# Patient Record
Sex: MALE | Race: BLACK OR AFRICAN AMERICAN
[De-identification: names, ages, dates, MRNs, and addresses within clinical notes are randomized per-mention and may not be internally consistent; named-entity substitution may affect disease eponyms.]

---

## 2021-12-04 ENCOUNTER — HOSPITAL ENCOUNTER (EMERGENCY)
Dept: HOSPITAL 56 - MW.ED | Age: 51
LOS: 1 days | Discharge: HOME | End: 2021-12-05
Payer: COMMERCIAL

## 2021-12-04 DIAGNOSIS — U07.1: Primary | ICD-10-CM

## 2021-12-04 LAB
BUN SERPL-MCNC: 8 MG/DL (ref 7–18)
CHLORIDE SERPL-SCNC: 103 MMOL/L (ref 98–107)
CO2 SERPL-SCNC: 28.6 MMOL/L (ref 21–32)
FLUAV RNA UPPER RESP QL NAA+PROBE: NEGATIVE
FLUBV RNA UPPER RESP QL NAA+PROBE: NEGATIVE
GLUCOSE SERPL-MCNC: 122 MG/DL (ref 74–106)
POTASSIUM SERPL-SCNC: 3.7 MMOL/L (ref 3.5–5.1)
SARS-COV-2 RNA RESP QL NAA+PROBE: POSITIVE
SODIUM SERPL-SCNC: 140 MMOL/L (ref 136–148)

## 2021-12-04 PROCEDURE — 85025 COMPLETE CBC W/AUTO DIFF WBC: CPT

## 2021-12-04 PROCEDURE — 85379 FIBRIN DEGRADATION QUANT: CPT

## 2021-12-04 PROCEDURE — 83735 ASSAY OF MAGNESIUM: CPT

## 2021-12-04 PROCEDURE — 0240U: CPT

## 2021-12-04 PROCEDURE — 99285 EMERGENCY DEPT VISIT HI MDM: CPT

## 2021-12-04 PROCEDURE — 36415 COLL VENOUS BLD VENIPUNCTURE: CPT

## 2021-12-04 PROCEDURE — 84484 ASSAY OF TROPONIN QUANT: CPT

## 2021-12-04 PROCEDURE — 71275 CT ANGIOGRAPHY CHEST: CPT

## 2021-12-04 PROCEDURE — 80048 BASIC METABOLIC PNL TOTAL CA: CPT

## 2021-12-04 PROCEDURE — 96372 THER/PROPH/DIAG INJ SC/IM: CPT

## 2021-12-04 PROCEDURE — 71045 X-RAY EXAM CHEST 1 VIEW: CPT

## 2021-12-04 PROCEDURE — 93005 ELECTROCARDIOGRAM TRACING: CPT

## 2021-12-04 NOTE — EDM.PDOC
ED HPI GENERAL MEDICAL PROBLEM





- General


Chief Complaint: Headache


Stated Complaint: HEADACHE


Time Seen by Provider: 12/04/21 19:20





- History of Present Illness


INITIAL COMMENTS - FREE TEXT/NARRATIVE: 





CHIEF COMPLAINT(S): Headache and chest pain








HISTORY OF PRESENT ILLNESS: This is a 51-year-old man without any significant 

past medical history who comes to the emergency department with a chief 

complaint of headache and chest pain.  The patient states that for approximately

1 day now he has been experiencing headache and chest pain.  He describes his 

headache as bifrontal not associated with any blurry vision, double vision, loss

of vision, numbness, tingling, weakness.  In addition he states that he is also 

experiencing right-sided chest pain which is intermittent.  He denies any 

shortness of breath, cough, runny nose.  He currently rates both pain as 5 out 

of 10.  He has not yet tried anything for the pain.  There are no exacerbating 

or relieving factors.  He states that he is vaccinated however he did get his 

first dose 3 days ago.  He denies any history of CAD or CHF, recent travel, 

recent surgery or prior history of DVT or PE.





 


REVIEW OF SYSTEMS: 





Constitutional: Denies fever, chills.


Eyes: Denies eye pain


Ears, Nose, Mouth, & Throat: Denies earache


Cardiovascular: Positive for chest pain


Respiratory: Denies shortness of breath


Gastrointestinal: Denies Nausea, vomiting, diarrhea, hematochezia.


Genitourinary: Denies hematuria


Skin:Denies a rash


MSK: Denies joint pain


Neurological: Positive for headache.  Denies blurred vision, numbness, tingling,

weakness, diplopia


Psychiatric: Denies depression








PAST MEDICAL HISTORY: As per history of present illness and as reviewed below 

otherwise noncontributory.





SURGICAL HISTORY: As per history of present illness and as reviewed below 

otherwise noncontributory.





SOCIAL HISTORY: As per history of present illness and as reviewed below 

otherwise noncontributory.





FAMILY HISTORY: As per history of present illness and as reviewed below 

otherwise noncontributory.








EXAMINATION OF ORGAN SYSTEMS/BODY AREAS: 





Constitutional: Blood pressure is 162/92, heart rate 98, respiratory rate 16 

with an oxygen saturation 98% on room air.  Temperature 36.5


General: Well-appearing man who is in no acute distress


Psychiatric: Appropriate mood and affect.


Eyes: No scleral icterus or conjunctival erythema


ENMT: Moist mucous membranes. No pharyngeal erythema


Cardiovascular: Regular, rate, and rhythm.  No gallops, murmurs, or rubs.  

Bilateral upper extremity pulses symmetric and intact.  No peripheral edema.  No

JVD.


Respiratory: Lungs clear to auscultation bilaterally.  No wheezes, rales, or 

rhonchi.


Gastrointestinal: Soft, non-tender, non-distended.  Normoactive bowel sounds


Genitourinary: No suprapubic tenderness


Musculoskeletal: Normal range of motion.


Skin: No lesions or abrasions.


Neurological:     AOx4. CN grossly intact. Strength 5/5 in bilateral upper and 

lower extremity. Sensation is intact bilaterally in upper and lower extremity. 

Gait appears normal. Finger to nose, heel to shin, rapid alternating movements 

intact.








MEDICAL DECISION MAKING AND COURSE IN THE ED WITH INTERPRETATION/REVIEW OF 

DIAGNOSTIC STUDIES: This is a 51-year-old man without any significant past 

medical history who comes to the emergency department with bifrontal headache 

and intermittent chest pain who overall appears well and is mildly hypertensive.

 At this time given his age will obtain on ACS work-up.  In addition PE is a 

consideration will obtain a D-dimer for stratification and obtaining angiogram 

if it is positive.  Screening EKG was unremarkable.  We will provide the patient

with aspirin and Toradol for pain relief.





DDx: ACS, Covid, pneumonia, pulmonary embolism





Laboratory: CBC is unremarkable.  BMP is unremarkable.  Troponin is negative.  

D-dimer is elevated at 0.61.





The radiological images were viewed by myself along with reading the report from

the radiologist.


Chest x-ray does not reveal an acute cardiopulmonary process.





Given the elevated D-dimer will obtain a CT angiogram of the chest to evaluate 

for pulmonary embolism.





The radiological images were viewed by myself along with reading the report from

the radiologist.


CT angiogram of the chest reveals no evidence of pulmonary embolism or acute 

intrathoracic process.





Laboratory: Covid is positive.  Influenza is negative.





After labs and imaging I did discuss the results with the patient.  I discussed 

that he would be stable for discharge.  He was given strict return precautions 

and is to isolate for the next 10 days.  He was amenable discharge and had no 

further questions











DISPOSITION: The patient was discharged home in stable condition. The patient 

will follow up with primary care physician after isolation





CONDITION: Fair





PROCEDURES: None





FINAL IMPRESSION(S)/DIAGNOSES: 





1.  Acute COVID-19 infection





 





Eliceo Croft M.D.


  ** Right Chest


Pain Score (Numeric/FACES): 5





- Related Data


                                    Allergies











Allergy/AdvReac Type Severity Reaction Status Date / Time


 


No Known Allergies Allergy   Verified 12/04/21 19:20











Home Meds: 


                                    Home Meds





. [No Known Home Meds]  07/30/18 [History]











Past Medical History





- Past Health History


Medical/Surgical History: Denies Medical/Surgical History





- Infectious Disease History


Infectious Disease History: Reports: None





Social & Family History





- Family History


Family Medical History: No Pertinent Family History





- Tobacco Use


Tobacco Use Status *Q: Never Tobacco User





- Caffeine Use


Caffeine Use: Reports: Coffee





- Recreational Drug Use


Recreational Drug Use: No





ED ROS GENERAL





- Review of Systems


Review Of Systems: See Below





ED EXAM, GENERAL





- Physical Exam


Exam: See Below





Course





- Vital Signs


Last Recorded V/S: 


                                Last Vital Signs











Temp  36.1 C   12/05/21 00:09


 


Pulse  81   12/05/21 00:09


 


Resp  14   12/05/21 00:09


 


BP  151/82 H  12/05/21 00:09


 


Pulse Ox  98   12/05/21 00:09














- Orders/Labs/Meds


Labs: 


                                Laboratory Tests











  12/04/21 12/04/21 12/04/21 Range/Units





  19:53 19:53 19:53 


 


WBC  5.83    (4.0-11.0)  K/uL


 


RBC  4.64    (4.50-5.90)  M/uL


 


Hgb  14.0    (13.0-17.0)  g/dL


 


Hct  40.3    (38.0-50.0)  %


 


MCV  86.9    (80.0-98.0)  fL


 


MCH  30.2    (27.0-32.0)  pg


 


MCHC  34.7    (31.0-37.0)  g/dL


 


RDW Std Deviation  42.1    (28.0-62.0)  fl


 


RDW Coeff of Dmitriy  13    (11.0-15.0)  %


 


Plt Count  252    (150-400)  K/uL


 


MPV  10.80    (7.40-12.00)  fL


 


Neut % (Auto)  61.6    (48.0-80.0)  %


 


Lymph % (Auto)  30.4    (16.0-40.0)  %


 


Mono % (Auto)  7.5    (0.0-15.0)  %


 


Eos % (Auto)  0.3    (0.0-7.0)  %


 


Baso % (Auto)  0.2    (0.0-1.5)  %


 


Neut # (Auto)  3.6    (1.4-5.7)  K/uL


 


Lymph # (Auto)  1.8    (0.6-2.4)  K/uL


 


Mono # (Auto)  0.4    (0.0-0.8)  K/uL


 


Eos # (Auto)  0.0    (0.0-0.7)  K/uL


 


Baso # (Auto)  0.0    (0.0-0.1)  K/uL


 


Nucleated RBC %  0.0    /100WBC


 


Nucleated RBCs #  0    K/uL


 


D-Dimer, Quantitative    0.61 H  (0.0-0.50)  mg/L FEU


 


Sodium   140   (136-148)  mmol/L


 


Potassium   3.7   (3.5-5.1)  mmol/L


 


Chloride   103   ()  mmol/L


 


Carbon Dioxide   28.6   (21.0-32.0)  mmol/L


 


BUN   8   (7.0-18.0)  mg/dL


 


Creatinine   0.9   (0.8-1.3)  mg/dL


 


Est Cr Clr Drug Dosing   93.45   mL/min


 


Estimated GFR (MDRD)   > 60.0   ml/min


 


Glucose   122 H   ()  mg/dL


 


Calcium   8.9   (8.5-10.1)  mg/dL


 


Magnesium   2.1   (1.8-2.4)  mg/dL


 


Troponin I   < 0.050   (0.000-0.056)  ng/mL


 


Influenza Type A RNA     (NEGATIVE)  


 


Influenza Type B RNA     (NEGATIVE)  


 


SARS-CoV-2 RNA (LEDY)     (NEGATIVE)  














  12/04/21 Range/Units





  22:03 


 


WBC   (4.0-11.0)  K/uL


 


RBC   (4.50-5.90)  M/uL


 


Hgb   (13.0-17.0)  g/dL


 


Hct   (38.0-50.0)  %


 


MCV   (80.0-98.0)  fL


 


MCH   (27.0-32.0)  pg


 


MCHC   (31.0-37.0)  g/dL


 


RDW Std Deviation   (28.0-62.0)  fl


 


RDW Coeff of Dmitriy   (11.0-15.0)  %


 


Plt Count   (150-400)  K/uL


 


MPV   (7.40-12.00)  fL


 


Neut % (Auto)   (48.0-80.0)  %


 


Lymph % (Auto)   (16.0-40.0)  %


 


Mono % (Auto)   (0.0-15.0)  %


 


Eos % (Auto)   (0.0-7.0)  %


 


Baso % (Auto)   (0.0-1.5)  %


 


Neut # (Auto)   (1.4-5.7)  K/uL


 


Lymph # (Auto)   (0.6-2.4)  K/uL


 


Mono # (Auto)   (0.0-0.8)  K/uL


 


Eos # (Auto)   (0.0-0.7)  K/uL


 


Baso # (Auto)   (0.0-0.1)  K/uL


 


Nucleated RBC %   /100WBC


 


Nucleated RBCs #   K/uL


 


D-Dimer, Quantitative   (0.0-0.50)  mg/L FEU


 


Sodium   (136-148)  mmol/L


 


Potassium   (3.5-5.1)  mmol/L


 


Chloride   ()  mmol/L


 


Carbon Dioxide   (21.0-32.0)  mmol/L


 


BUN   (7.0-18.0)  mg/dL


 


Creatinine   (0.8-1.3)  mg/dL


 


Est Cr Clr Drug Dosing   mL/min


 


Estimated GFR (MDRD)   ml/min


 


Glucose   ()  mg/dL


 


Calcium   (8.5-10.1)  mg/dL


 


Magnesium   (1.8-2.4)  mg/dL


 


Troponin I   (0.000-0.056)  ng/mL


 


Influenza Type A RNA  NEGATIVE  (NEGATIVE)  


 


Influenza Type B RNA  NEGATIVE  (NEGATIVE)  


 


SARS-CoV-2 RNA (LEDY)  POSITIVE H  (NEGATIVE)  











Meds: 


Medications














Discontinued Medications














Generic Name Dose Route Start Last Admin





  Trade Name Freq  PRN Reason Stop Dose Admin


 


Aspirin  324 mg  12/04/21 19:33  12/04/21 19:43





  Aspirin 81 Mg Tab.Chew  PO  12/04/21 19:34  324 mg





  ONETIME ONE   Administration


 


Iopamidol  100 ml  12/04/21 21:56  12/04/21 22:12





  Iopamidol 755 Mg/Ml 500 Ml Multipack Bottle  IVPUSH  12/04/21 21:57  100 ml





  ONETIME ONE   Administration


 


Ketorolac Tromethamine  15 mg  12/04/21 19:53  12/04/21 20:09





  Ketorolac 15 Mg/Ml Sdv  IM  12/04/21 19:54  15 mg





  ONETIME ONE   Administration














Departure





- Departure


Time of Disposition: 00:09


Disposition: Home, Self-Care 01


Condition: Fair


Clinical Impression: 


 COVID-19








- Discharge Information


*PRESCRIPTION DRUG MONITORING PROGRAM REVIEWED*: No


*COPY OF PRESCRIPTION DRUG MONITORING REPORT IN PATIENT KIARA: No


Instructions:  10 Things You Can Do to Manage Your COVID-19 Symptoms at Home - 

[facility] (07/16/2021), Symptoms of COVID-19 - [facility] (02/22/2021), COVID-19: Quarantine 

vs. Isolation - [facility] (12/17/2020)


Referrals: 


PCP,None [Primary Care Provider] - 


Forms:  ED Department Discharge


Additional Instructions: 


You were evaluated today on an emergent basis. At this time you were diagnosed 

with COVID-19. I do believe this is causing your symptoms.





You should take acetaminophen 500-1000 mg every 6 hours as needed for fever and 

muscle aches.


Please drink plenty of fluids and get plenty of rest over the next several days.


We would recommend that you get a pulse oximeter from the pharmacy to keep an 

eye on your oxygen level.  If your oxygen level drops below 91%, you should 

return to the ED for evaluation.  You should return to the ER sooner if you 

start having any symptoms of shortness of breath or any other new or concerning 

symptoms.





1.  Your COVID-19 screening is positive.  That means you do have the coronavirus

and you are considered contagious.  Your vital signs and oxygen saturation are 

well enough that you were able to monitor your symptoms at home.  Continue to 

monitor for trouble breathing,  new confusion or inability to arouse, bluish 

lips or face or any of the other symptoms we discussed -if this occurs please 

return to the emergency room.


2.  Please self quarantine over the next 10 days.  Inform any persons that you 

have been in contact with since you started becoming symptomatic that you have 

tested positive; they should be made aware and take the appropriate steps as 

needed.


3.  May alternate Tylenol and ibuprofen as needed for pain and fever management.


4. The [facility] department will be calling you and following up with you. 

The [place] COVID 19 Hotline phone number [phone], They are open Monday - 

Friday 7am - 7pm. Follow up with your primary care provider for re-evaluation 

and re-testing after the 10 day quarantine and discuss when you should be seen.





[facility]                                              

 


[address] [place] [address]                                                             

               


Phone: [phone]                                                           

            


Fax: [phone]    





[facility]


[address] [place] [address]                                                             

               


Phone: [phone]                                                           

                                


 Fax: [phone]





The patient is informed of any results of their evaluation and diagnostic workup

and all questions are answered. They are given discharge instructions and return

precautions. The patient is stable for discharge.  The patient states they 

understand and agree with the plan and that they will return if their symptoms 

get worse or if they have any new concerns.





The following information is given to patients seen in the emergency department 

who are being discharged to home. This information is to outline your options 

for follow-up care. We provide all patients seen in our emergency department 

with a follow-up referral.





The need for follow-up, as well as the timing and circumstances, are variable 

depending upon the specifics of your emergency department visit.





If you don't have a primary care physician on staff, we will provide you with a 

referral. We always advise you to contact your personal physician following an 

emergency department visit to inform them of the circumstance of the visit and 

for follow-up with them and/or the need for any referrals to a consulting 

specialist.





The emergency department will also refer you to a specialist when appropriate. 

This referral assures that you have the opportunity for follow-up care with a 

specialist. All of these measure are taken in an effort to provide you with 

optimal care, which includes your follow-up.





Under all circumstances we always encourage you to contact your private 

physician who remains a resource for coordinating your care. When calling for 

follow-up care, please make the office aware that this follow-up is from your 

recent emergency room visit. If for any reason you are refused follow-up, please

contact the [facility] Emergency Department

at [phone] and asked to speak to the emergency department charge nurse.











Sepsis Event Note (ED)





- Evaluation


Sepsis Screening Result: No Definite Risk

## 2021-12-04 NOTE — CR
INDICATION:



Chest pain. 



TECHNIQUE:



Chest 1 view. 



COMPARISON:



None 



FINDINGS:



Cardiovascular and mediastinum: Heart size and vasculature are normal in 

caliber and appearance.  Mediastinum is within normal limits.  



Lungs and pleural space: Lungs are clear.  No sign of infiltrate or mass.  

No sign of pleural effusion.  No pneumothorax.  



Bones and soft tissues: No significant findings.  



IMPRESSION:



Lungs are clear.



Dictated by Luis Amaya MD @ 12/4/2021 8:11:37 PM



(Electronically Signed)
No

## 2021-12-04 NOTE — PCM.EKG
** #1 Interpretation


EKG Date: 12/04/21


Time: 19:23


Rhythm: NSR


Rate (Beats/Min): 100


Axis: Normal


P-Wave: Present


QRS: Normal


ST-T: Normal


QT: Normal


Comparison: NA - No Prior EKG


EKG Interpretation Comments: 





Sinus tachycardia

## 2021-12-04 NOTE — CT
INDICATION:



Chest pain, shortness of breath



TECHNIQUE:



Contrast enhanced axial CT imaging through the chest, optimized for 

assessment of the pulmonary arterial tree. 100 mL Isovue 370 contrast agent 

was administered intravenously. Sagittal and coronal reconstructions are 

provided. 



COMPARISON:



None



FINDINGS:



There is adequate opacification of the pulmonary arterial tree without 

evidence of thromboembolism. The main pulmonary artery is nonenlarged. 



The heart is normal in size. There is no pericardial effusion. The thoracic 

aorta is normal in caliber. There is no mediastinal lymphadenopathy. 



The lungs are clear. There is no pleural effusion or pneumothorax.



The thoracic osseous structures are unremarkable. No significant 

abnormality is demonstrated in the visualized upper abdomen.



IMPRESSION:



No evidence of pulmonary embolism or other acute intrathoracic process.



Please note that all CT scans at this facility use dose modulation, 

iterative reconstruction, and/or weight-based dosing when appropriate to 

reduce radiation dose to as low as reasonably achievable.



Dictated by Melany Morin MD @ 12/4/2021 11:10:47 PM



(Electronically Signed)

## 2022-12-04 ENCOUNTER — HOSPITAL ENCOUNTER (EMERGENCY)
Dept: HOSPITAL 56 - MW.ED | Age: 52
Discharge: HOME | End: 2022-12-04
Payer: MEDICAID

## 2022-12-04 DIAGNOSIS — U07.1: Primary | ICD-10-CM

## 2022-12-04 LAB
FLUAV RNA UPPER RESP QL NAA+PROBE: NEGATIVE
FLUBV RNA UPPER RESP QL NAA+PROBE: NEGATIVE
RSV RNA UPPER RESP QL NAA+PROBE: NEGATIVE
SARS-COV-2 RNA RESP QL NAA+PROBE: POSITIVE

## 2022-12-04 PROCEDURE — 0241U: CPT

## 2022-12-04 PROCEDURE — 99283 EMERGENCY DEPT VISIT LOW MDM: CPT

## 2022-12-08 ENCOUNTER — HOSPITAL ENCOUNTER (EMERGENCY)
Dept: HOSPITAL 56 - MW.ED | Age: 52
Discharge: HOME | End: 2022-12-08
Payer: MEDICAID

## 2022-12-08 DIAGNOSIS — R07.89: Primary | ICD-10-CM

## 2022-12-08 LAB
BUN SERPL-MCNC: 12 MG/DL (ref 7–18)
CHLORIDE SERPL-SCNC: 102 MMOL/L (ref 98–107)
CO2 SERPL-SCNC: 27.7 MMOL/L (ref 21–32)
EGFRCR SERPLBLD CKD-EPI 2021: 91 ML/MIN (ref 60–?)
GLUCOSE SERPL-MCNC: 122 MG/DL (ref 74–106)
POTASSIUM SERPL-SCNC: 4.1 MMOL/L (ref 3.5–5.1)
SODIUM SERPL-SCNC: 138 MMOL/L (ref 136–148)